# Patient Record
Sex: MALE | Race: WHITE | Employment: UNEMPLOYED | URBAN - METROPOLITAN AREA
[De-identification: names, ages, dates, MRNs, and addresses within clinical notes are randomized per-mention and may not be internally consistent; named-entity substitution may affect disease eponyms.]

---

## 2017-10-30 ENCOUNTER — HOSPITAL ENCOUNTER (OUTPATIENT)
Dept: GENERAL RADIOLOGY | Age: 27
Discharge: HOME | End: 2017-10-30
Attending: ORTHOPAEDIC SURGERY
Payer: COMMERCIAL

## 2017-10-30 ENCOUNTER — HOSPITAL ENCOUNTER (OUTPATIENT)
Dept: CT IMAGING | Age: 27
Discharge: HOME | End: 2017-10-30
Attending: ORTHOPAEDIC SURGERY
Payer: COMMERCIAL

## 2017-10-30 DIAGNOSIS — S43.014A ANTERIOR SHOULDER DISLOCATION, RIGHT, INITIAL ENCOUNTER: ICD-10-CM

## 2017-10-30 PROCEDURE — 74011636320 HC RX REV CODE- 636/320: Performed by: RADIOLOGY

## 2017-10-30 PROCEDURE — 73201 CT UPPER EXTREMITY W/DYE: CPT

## 2017-10-30 PROCEDURE — 23350 INJECTION FOR SHOULDER X-RAY: CPT

## 2017-10-30 PROCEDURE — 74011000250 HC RX REV CODE- 250: Performed by: RADIOLOGY

## 2017-10-30 RX ORDER — LIDOCAINE HYDROCHLORIDE 10 MG/ML
10 INJECTION INFILTRATION; PERINEURAL
Status: COMPLETED | OUTPATIENT
Start: 2017-10-30 | End: 2017-10-30

## 2017-10-30 RX ORDER — SODIUM CHLORIDE 9 MG/ML
3 INJECTION INTRAMUSCULAR; INTRAVENOUS; SUBCUTANEOUS
Status: DISPENSED | OUTPATIENT
Start: 2017-10-30 | End: 2017-10-31

## 2017-10-30 RX ADMIN — LIDOCAINE HYDROCHLORIDE 10 ML: 10 INJECTION, SOLUTION INFILTRATION; PERINEURAL at 16:07

## 2017-10-30 RX ADMIN — IOHEXOL 20 ML: 180 INJECTION INTRAVENOUS at 16:10

## 2018-07-17 ENCOUNTER — OFFICE VISIT (OUTPATIENT)
Dept: INTERNAL MEDICINE CLINIC | Age: 28
End: 2018-07-17

## 2018-07-17 VITALS
BODY MASS INDEX: 25.33 KG/M2 | RESPIRATION RATE: 16 BRPM | OXYGEN SATURATION: 99 % | SYSTOLIC BLOOD PRESSURE: 128 MMHG | WEIGHT: 171 LBS | HEIGHT: 69 IN | DIASTOLIC BLOOD PRESSURE: 96 MMHG | HEART RATE: 92 BPM | TEMPERATURE: 98.1 F

## 2018-07-17 DIAGNOSIS — F41.9 ANXIETY: ICD-10-CM

## 2018-07-17 DIAGNOSIS — K21.9 GASTROESOPHAGEAL REFLUX DISEASE, ESOPHAGITIS PRESENCE NOT SPECIFIED: ICD-10-CM

## 2018-07-17 DIAGNOSIS — I10 HYPERTENSION, ESSENTIAL: Primary | ICD-10-CM

## 2018-07-17 RX ORDER — METOPROLOL SUCCINATE 25 MG/1
25 TABLET, EXTENDED RELEASE ORAL DAILY
COMMUNITY
End: 2022-06-16 | Stop reason: ALTCHOICE

## 2018-07-17 RX ORDER — CHOLECALCIFEROL (VITAMIN D3) 125 MCG
1 CAPSULE ORAL
COMMUNITY

## 2018-07-17 RX ORDER — ALPRAZOLAM 1 MG/1
1 TABLET ORAL
COMMUNITY

## 2018-07-17 RX ORDER — DULOXETIN HYDROCHLORIDE 60 MG/1
60 CAPSULE, DELAYED RELEASE ORAL DAILY
COMMUNITY
End: 2022-07-28 | Stop reason: ALTCHOICE

## 2018-07-17 NOTE — MR AVS SNAPSHOT
[address] 
[phone] Patient: Naa Arboleda MRN: [id number] WUA:8/46/1980 Visit Information Date & Time Provider Department Dept. Phone Encounter #  
 7/17/2018  1:30 PM Arthur Loco, [address] [place] [address] Internal Medicine [phone] [id number] Follow-up Instructions Return if symptoms worsen or fail to improve. Upcoming Health Maintenance Date Due DTaP/Tdap/Td series (1 - Tdap) 3/15/2011 Influenza Age 5 to Adult 8/1/2018 Allergies as of 7/17/2018  Review Complete On: 7/17/2018 By: Arthur Loco MD  
 No Known Allergies Current Immunizations  Reviewed on 7/17/2018 No immunizations on file. Reviewed by Yessica Martinez RN on 7/17/2018 at  1:33 PM  
You Were Diagnosed With   
  
 Codes Comments Hypertension, essential    -  Primary ICD-10-CM: I10 
ICD-9-CM: 401.9 Gastroesophageal reflux disease, esophagitis presence not specified     ICD-10-CM: K21.9 ICD-9-CM: 530.81 Vitals BP Pulse Temp Resp Height(growth percentile) Weight(growth percentile) (!) 128/96 92 98.1 °F (36.7 °C) (Oral) 16 5' 9.1\" (1.755 m) 171 lb (77.6 kg) SpO2 BMI Smoking Status 99% 25.18 kg/m2 Former Smoker BMI and BSA Data Body Mass Index Body Surface Area  
 25.18 kg/m 2 1.95 m 2 Preferred Pharmacy Pharmacy Name Phone [facility] [address] [facility], [address] [facility] [phone] Your Updated Medication List  
  
   
This list is accurate as of 7/17/18  2:05 PM.  Always use your most recent med list.  
  
  
  
  
 ALEVE 220 mg Cap Generic drug:  naproxen sodium Take 1 Cap by mouth daily as needed. CYMBALTA 60 mg capsule Generic drug:  DULoxetine Take 60 mg by mouth daily. metoprolol succinate 25 mg XL tablet Commonly known as:  TOPROL-XL Take 25 mg by mouth daily. XANAX 1 mg tablet Generic drug:  ALPRAZolam  
Take 1 mg by mouth three (3) times daily as needed for Anxiety. Follow-up Instructions Return if symptoms worsen or fail to improve. Patient Instructions Checking your blood pressure at home: Your blood pressure was either borderline or to high. Please check your blood pressure 1 times per day. [place] BP is 120/80. Your BP should be under 140 for the top number and 90 for the bottom number. Please follow up with your PCP in 3-4 weeks. Bring in your cuff to that visit to calibrate it and look for white coat hypertension. Also look into correlation with Cymbalta and when elevated BP started. Minimize the use of Aleve. Learning About High Blood Pressure What is high blood pressure? Blood pressure is a measure of how hard the blood pushes against the walls of your arteries. It's normal for blood pressure to go up and down throughout the day, but if it stays up, you have high blood pressure. Another name for high blood pressure is hypertension. Two numbers tell you your blood pressure. The first number is the systolic pressure. It shows how hard the blood pushes when your heart is pumping. The second number is the diastolic pressure. It shows how hard the blood pushes between heartbeats, when your heart is relaxed and filling with blood. A blood pressure of less than 120/80 (say \"120 over 80\") is ideal for an adult. High blood pressure is 130/80 or higher. You have high blood pressure if your top number is 130 or higher or your bottom number is 80 or higher, or both. What happens when you have high blood pressure? · Blood flows through your arteries with too much force. Over time, this damages the walls of your arteries. But you can't feel it. High blood pressure usually doesn't cause symptoms. · Fat and calcium start to build up in your arteries.  This buildup is called plaque. Plaque makes your arteries narrower and stiffer. Blood can't flow through them as easily. · This lack of good blood flow starts to damage some of the organs in your body. This can lead to problems such as coronary artery disease and heart attack, heart failure, stroke, kidney failure, and eye damage. How can you prevent high blood pressure? · Stay at a healthy weight. · Try to limit how much sodium you eat to less than 2,300 milligrams (mg) a day. If you limit your sodium to 1,500 mg a day, you can lower your blood pressure even more. ¨ Buy foods that are labeled \"unsalted,\" \"sodium-free,\" or \"low-sodium. \" Foods labeled \"reduced-sodium\" and \"light sodium\" may still have too much sodium. ¨ Flavor your food with garlic, lemon juice, onion, vinegar, herbs, and spices instead of salt. Do not use soy sauce, steak sauce, onion salt, garlic salt, mustard, or ketchup on your food. ¨ Use less salt (or none) when recipes call for it. You can often use half the salt a recipe calls for without losing flavor. · Be physically active. Get at least 30 minutes of exercise on most days of the week. Walking is a good choice. You also may want to do other activities, such as running, swimming, cycling, or playing tennis or team sports. · Limit alcohol to 2 drinks a day for men and 1 drink a day for women. · Eat plenty of fruits, vegetables, and low-fat dairy products. Eat less saturated and total fats. How is high blood pressure treated? · Your doctor will suggest making lifestyle changes. For example, your doctor may ask you to eat healthy foods, quit smoking, lose extra weight, and be more active. · If lifestyle changes don't help enough or your blood pressure is very high, you will have to take medicine every day. Follow-up care is a key part of your treatment and safety.  Be sure to make and go to all appointments, and call your doctor if you are having problems. It's also a good idea to know your test results and keep a list of the medicines you take. Where can you learn more? Go to http://luke-yue.info/. Enter P501 in the search box to learn more about \"Learning About High Blood Pressure. \" Current as of: May 10, 2017 Content Version: 11.7 © 2052-6552 [employer]. Care instructions adapted under license by [employer] (which disclaims liability or warranty for this information). If you have questions about a medical condition or this instruction, always ask your healthcare professional. [address] any warranty or liability for your use of this information. Introducing [facility]! Serafin Kitchen introduces Spotie patient portal. Now you can access parts of your medical record, email your doctor's office, and request medication refills online. 1. In your internet browser, go to [email or web address] 2. Click on the First Time User? Click Here link in the Sign In box. You will see the New Member Sign Up page. 3. Enter your Spotie Access Code exactly as it appears below. You will not need to use this code after youve completed the sign-up process. If you do not sign up before the expiration date, you must request a new code. · Spotie Access Code: [email or web address] Expires: 10/15/2018  1:22 PM 
 
4. Enter the last four digits of your Social Security Number (xxxx) and Date of Birth (mm/dd/yyyy) as indicated and click Submit. You will be taken to the next sign-up page. 5. Create a Wantworthyt ID. This will be your Spotie login ID and cannot be changed, so think of one that is secure and easy to remember. 6. Create a Spotie password. You can change your password at any time. 7. Enter your Password Reset Question and Answer. This can be used at a later time if you forget your password. 8. Enter your e-mail address. You will receive e-mail notification when new information is available in [address] 9. Click Sign Up. You can now view and download portions of your medical record. 10. Click the Download Summary menu link to download a portable copy of your medical information. If you have questions, please visit the Frequently Asked Questions section of the [facility] website. Remember, [facility] is NOT to be used for urgent needs. For medical emergencies, dial 911. Now available from your iPhone and Android! Please provide this summary of care documentation to your next provider. Your primary care clinician is listed as Yared Martinez. If you have any questions after today's visit, please call [phone].

## 2018-07-17 NOTE — PROGRESS NOTES
Cory Arboleda is a 29y.o. year old male who is a new patient to me today (07/17/18). He presents to clinic today for a 2nd opinion regarding high BP. His father is a patient of mine and requested my input as whether his son was getting the correct care. Assessment & Plan:   Diagnoses and all orders for this visit:    1. Hypertension, essential- new dx to me, this is a chronic issue, reviewed multiple reason BP can be elevated. Most likely does have elevated BP but did review white coat HTN vs medication induced. He will get amb BP and start checking daily. He will then f/u with his current PCP. Also asked him to talk to PCP about timing of Cymbalta with increase in BP as this is a known side effect. Reviewed classes of medications and alternative options. No changes were made to his regimen. See AVS for instructions. 2. Gastroesophageal reflux disease, esophagitis presence not specified- new dx, poorly controlled, will defer to PCP, reviewed diet triggers. 3. Anxiety- new dx, chronic issue, unclear control, again will defer to current PCP. Reviewed verifying increase in BP does not correlate w/ starting SNRI. Weight is stable for the last few years per him, I have reviewed/discussed the above normal BMI with the patient. I have recommended the following interventions: encourage exercise and lifestyle education regarding diet. Follow-up Disposition:  Return if symptoms worsen or fail to improve. Subjective: Katie Georges was seen today for Establish Care; Hypertension; Depression; and Anxiety    Cardiovascular Review  The patient has hypertension. He reports have been fluctuating ambulatory BP for the last few years. Finally started on medications 9-10 months ago. He does not check ambulatory BP's at home. This is the only medication he has been on. He has been on Xanax for ~14 yrs and Cymbalta for the last 3 yrs.     He reports taking medications as instructed, no medication side effects noted. Diet and Lifestyle: generally follows a low sodium diet, sedentary. Labs: no lab studies available for review at time of visit. He reports weight has been steadily increasing, 5 lbs/yr for the last few years. Lab work from 2010 reviewed and these are the only ones available. VS reviewed, all from ER visits for dislocated shoulder. The following sections were reviewed & updated as appropriate: PMH, PL, PSH, FH, and SH. Patient Active Problem List   Diagnosis Code    Hypertension, essential I10    Anxiety F41.9          Prior to Admission medications    Medication Sig Start Date End Date Taking? Authorizing Provider   ALPRAZolam Yessy Drone) 1 mg tablet Take 1 mg by mouth three (3) times daily as needed for Anxiety. Yes Historical Provider   DULoxetine (CYMBALTA) 60 mg capsule Take 60 mg by mouth daily. Yes Historical Provider   metoprolol succinate (TOPROL-XL) 25 mg XL tablet Take 25 mg by mouth daily. Yes Historical Provider   naproxen sodium (ALEVE) 220 mg cap Take 1 Cap by mouth daily as needed. Yes Historical Provider          No Known Allergies         Review of Systems   Constitutional: Negative for malaise/fatigue and weight loss. Respiratory: Negative for cough and shortness of breath. Cardiovascular: Negative for chest pain, palpitations and leg swelling. Gastrointestinal: Positive for heartburn (intermittent). Negative for abdominal pain, constipation, diarrhea, nausea and vomiting. Genitourinary: Negative for frequency. Musculoskeletal: Negative for joint pain and myalgias. Skin: Negative for rash. Neurological: Negative for tingling, sensory change, focal weakness and headaches. Psychiatric/Behavioral: Negative for depression. The patient is not nervous/anxious and does not have insomnia. Objective:   Physical Exam   Constitutional: No distress.    HENT:   Mouth/Throat: Mucous membranes are normal.   Eyes: Conjunctivae are normal. No scleral icterus. Neck: Neck supple. Cardiovascular: Regular rhythm and normal heart sounds. No murmur heard. Pulmonary/Chest: Effort normal and breath sounds normal. He has no wheezes. He has no rales. Abdominal: Soft. Bowel sounds are normal. He exhibits no mass. There is no hepatosplenomegaly. There is no tenderness. Musculoskeletal: He exhibits no edema. Lymphadenopathy:     He has no cervical adenopathy. Skin: No rash noted. Psychiatric: He has a normal mood and affect. His behavior is normal.          Visit Vitals    BP (!) 128/96    Pulse 92    Temp 98.1 °F (36.7 °C) (Oral)    Resp 16    Ht 5' 9.1\" (1.755 m)    Wt 171 lb (77.6 kg)    SpO2 99%    BMI 25.18 kg/m2         Disclaimer:  Advised him to call back or return to office if symptoms worsen/change/persist.  Discussed expected course/resolution/complications of diagnosis in detail with patient. Medication risks/benefits/costs/interactions/alternatives discussed with patient. He was given an after visit summary which includes diagnoses, current medications, & vitals. He expressed understanding with the diagnosis and plan. Aspects of this note may have been generated using voice recognition software. Despite editing, there may be some syntax errors.        Nancy Mccall MD

## 2018-07-17 NOTE — PATIENT INSTRUCTIONS
Checking your blood pressure at home: Your blood pressure was either borderline or to high. Please check your blood pressure 1 times per day. [place] BP is 120/80. Your BP should be under 140 for the top number and 90 for the bottom number. Please follow up with your PCP in 3-4 weeks. Bring in your cuff to that visit to calibrate it and look for white coat hypertension. Also look into correlation with Cymbalta and when elevated BP started. Minimize the use of Aleve. Learning About High Blood Pressure  What is high blood pressure? Blood pressure is a measure of how hard the blood pushes against the walls of your arteries. It's normal for blood pressure to go up and down throughout the day, but if it stays up, you have high blood pressure. Another name for high blood pressure is hypertension. Two numbers tell you your blood pressure. The first number is the systolic pressure. It shows how hard the blood pushes when your heart is pumping. The second number is the diastolic pressure. It shows how hard the blood pushes between heartbeats, when your heart is relaxed and filling with blood. A blood pressure of less than 120/80 (say \"120 over 80\") is ideal for an adult. High blood pressure is 130/80 or higher. You have high blood pressure if your top number is 130 or higher or your bottom number is 80 or higher, or both. What happens when you have high blood pressure? · Blood flows through your arteries with too much force. Over time, this damages the walls of your arteries. But you can't feel it. High blood pressure usually doesn't cause symptoms. · Fat and calcium start to build up in your arteries. This buildup is called plaque. Plaque makes your arteries narrower and stiffer. Blood can't flow through them as easily. · This lack of good blood flow starts to damage some of the organs in your body.  This can lead to problems such as coronary artery disease and heart attack, heart failure, stroke, kidney failure, and eye damage. How can you prevent high blood pressure? · Stay at a healthy weight. · Try to limit how much sodium you eat to less than 2,300 milligrams (mg) a day. If you limit your sodium to 1,500 mg a day, you can lower your blood pressure even more. ¨ Buy foods that are labeled \"unsalted,\" \"sodium-free,\" or \"low-sodium. \" Foods labeled \"reduced-sodium\" and \"light sodium\" may still have too much sodium. ¨ Flavor your food with garlic, lemon juice, onion, vinegar, herbs, and spices instead of salt. Do not use soy sauce, steak sauce, onion salt, garlic salt, mustard, or ketchup on your food. ¨ Use less salt (or none) when recipes call for it. You can often use half the salt a recipe calls for without losing flavor. · Be physically active. Get at least 30 minutes of exercise on most days of the week. Walking is a good choice. You also may want to do other activities, such as running, swimming, cycling, or playing tennis or team sports. · Limit alcohol to 2 drinks a day for men and 1 drink a day for women. · Eat plenty of fruits, vegetables, and low-fat dairy products. Eat less saturated and total fats. How is high blood pressure treated? · Your doctor will suggest making lifestyle changes. For example, your doctor may ask you to eat healthy foods, quit smoking, lose extra weight, and be more active. · If lifestyle changes don't help enough or your blood pressure is very high, you will have to take medicine every day. Follow-up care is a key part of your treatment and safety. Be sure to make and go to all appointments, and call your doctor if you are having problems. It's also a good idea to know your test results and keep a list of the medicines you take. Where can you learn more? Go to http://luke-yue.info/. Enter P501 in the search box to learn more about \"Learning About High Blood Pressure. \"  Current as of:  May 10, 2017  Content Version: 11.7  © 0996-5317 [employer]. Care instructions adapted under license by [employer] (which disclaims liability or warranty for this information). If you have questions about a medical condition or this instruction, always ask your healthcare professional. [place] [address] any warranty or liability for your use of this information.

## 2022-03-19 PROBLEM — I10 HYPERTENSION, ESSENTIAL: Status: ACTIVE | Noted: 2018-07-17

## 2022-03-19 PROBLEM — F41.9 ANXIETY: Status: ACTIVE | Noted: 2018-07-17

## 2022-06-16 ENCOUNTER — OFFICE VISIT (OUTPATIENT)
Dept: CARDIOLOGY CLINIC | Age: 32
End: 2022-06-16
Payer: COMMERCIAL

## 2022-06-16 VITALS
SYSTOLIC BLOOD PRESSURE: 112 MMHG | OXYGEN SATURATION: 97 % | HEIGHT: 70 IN | WEIGHT: 162.6 LBS | HEART RATE: 79 BPM | DIASTOLIC BLOOD PRESSURE: 62 MMHG | RESPIRATION RATE: 16 BRPM | BODY MASS INDEX: 23.28 KG/M2

## 2022-06-16 DIAGNOSIS — I10 HYPERTENSION, ESSENTIAL: Primary | ICD-10-CM

## 2022-06-16 DIAGNOSIS — F41.9 ANXIETY: ICD-10-CM

## 2022-06-16 PROCEDURE — 99204 OFFICE O/P NEW MOD 45 MIN: CPT | Performed by: SPECIALIST

## 2022-06-16 RX ORDER — FLUOXETINE HYDROCHLORIDE 40 MG/1
40 CAPSULE ORAL DAILY
COMMUNITY
Start: 2022-04-27

## 2022-06-16 RX ORDER — FLUOXETINE HYDROCHLORIDE 40 MG/1
CAPSULE ORAL
COMMUNITY
Start: 2022-05-28

## 2022-06-16 NOTE — PROGRESS NOTES
HISTORY OF PRESENT ILLNESS  Junior Arboleda is a 28 y.o. male. He is seen for a second opinion regarding hypertension. He has been on Toprol-XL 25 mg a day for the past 4 years and it was started with a blood pressure of 160/110 at that time. He had extreme anxiety as he was going through a divorce and living with father people who were doing drugs. He was doing 6 beers a week but stopped drinking several months ago. He claims to be an alcoholic. His weight is down 24 pounds over the past 2 years. He does not smoke cigarettes. He exercises 4 times a week and can run a mile and do sit ups and yoga. HPI  Patient Active Problem List   Diagnosis Code    Hypertension, essential I10    Anxiety F41.9     Current Outpatient Medications   Medication Sig Dispense Refill    FLUoxetine (PROzac) 40 mg capsule Take 40 mg by mouth daily.  ALPRAZolam (XANAX) 1 mg tablet Take 1 mg by mouth three (3) times daily as needed for Anxiety.  naproxen sodium (ALEVE) 220 mg cap Take 1 Cap by mouth daily as needed.  FLUoxetine (PROzac) 40 mg capsule       DULoxetine (CYMBALTA) 60 mg capsule Take 60 mg by mouth daily. Past Medical History:   Diagnosis Date    Anxiety 7/17/2018    Asthma     Hypertension, essential 7/17/2018     Past Surgical History:   Procedure Laterality Date    HX SHOULDER ARTHROSCOPY Right     due to frequent disloations       Review of Systems   Constitutional: Positive for weight loss. All other systems reviewed and are negative. Visit Vitals  /62 (BP 1 Location: Left upper arm, BP Patient Position: Sitting, BP Cuff Size: Adult)   Pulse 79   Resp 16   Ht 5' 9.5\" (1.765 m)   Wt 162 lb 9.6 oz (73.8 kg)   SpO2 97%   BMI 23.67 kg/m²       Physical Exam  Vitals and nursing note reviewed. Constitutional:       Appearance: Normal appearance. HENT:      Head: Normocephalic.       Right Ear: External ear normal.      Left Ear: External ear normal.      Nose: Nose normal. Mouth/Throat:      Mouth: Mucous membranes are moist.   Eyes:      Extraocular Movements: Extraocular movements intact. Cardiovascular:      Rate and Rhythm: Normal rate and regular rhythm. Heart sounds: Normal heart sounds. Pulmonary:      Breath sounds: Normal breath sounds. Abdominal:      Palpations: Abdomen is soft. Musculoskeletal:         General: Normal range of motion. Cervical back: Normal range of motion. Skin:     General: Skin is warm. Neurological:      Mental Status: He is alert and oriented to person, place, and time. Psychiatric:         Behavior: Behavior normal.         ASSESSMENT and PLAN  He is on a very low-dose of metoprolol and his overall situation is much different than it was 4 years ago. I will stop the Toprol-XL 25 mg and see him back in 6 weeks.

## 2022-07-28 ENCOUNTER — OFFICE VISIT (OUTPATIENT)
Dept: CARDIOLOGY CLINIC | Age: 32
End: 2022-07-28
Payer: COMMERCIAL

## 2022-07-28 VITALS
WEIGHT: 175 LBS | OXYGEN SATURATION: 97 % | DIASTOLIC BLOOD PRESSURE: 60 MMHG | SYSTOLIC BLOOD PRESSURE: 130 MMHG | HEIGHT: 69 IN | BODY MASS INDEX: 25.92 KG/M2 | HEART RATE: 79 BPM | RESPIRATION RATE: 16 BRPM

## 2022-07-28 DIAGNOSIS — I10 HYPERTENSION, ESSENTIAL: Primary | ICD-10-CM

## 2022-07-28 DIAGNOSIS — F41.9 ANXIETY: ICD-10-CM

## 2022-07-28 PROCEDURE — 99214 OFFICE O/P EST MOD 30 MIN: CPT | Performed by: SPECIALIST

## 2022-07-28 RX ORDER — MELOXICAM 15 MG/1
15 TABLET ORAL DAILY
COMMUNITY
Start: 2022-07-15 | End: 2022-08-14

## 2022-07-28 NOTE — PROGRESS NOTES
HISTORY OF PRESENT ILLNESS  Nandini Arboleda is a 28 y.o. male. He is seen in follow-up for hypertension. He had been on metoprolol long-acting 25 mg and his blood pressures were quite low and I stopped the medication. His blood pressure is still normal but up somewhat but he is also gained 13 pounds. He tells me that his weight can fluctuate a great deal and he is hungry all the time. He does exercise occasionally and can run a mile. He takes several medications for anxiety and depression. HPI  Patient Active Problem List   Diagnosis Code    Hypertension, essential I10    Anxiety F41.9     Current Outpatient Medications   Medication Sig Dispense Refill    meloxicam (MOBIC) 15 mg tablet Take 15 mg by mouth in the morning. FLUoxetine (PROzac) 40 mg capsule Take 40 mg by mouth daily. ALPRAZolam (XANAX) 1 mg tablet Take 1 mg by mouth three (3) times daily as needed for Anxiety. naproxen sodium 220 mg cap Take 1 Cap by mouth daily as needed. FLUoxetine (PROzac) 40 mg capsule        Past Medical History:   Diagnosis Date    Anxiety 7/17/2018    Asthma     Hypertension, essential 7/17/2018     Past Surgical History:   Procedure Laterality Date    HX SHOULDER ARTHROSCOPY Right     due to frequent disloations       Review of Systems   Psychiatric/Behavioral:  The patient is nervous/anxious. All other systems reviewed and are negative. Visit Vitals  /60   Pulse 79   Resp 16   Ht 5' 9\" (1.753 m)   Wt 175 lb (79.4 kg)   SpO2 97%   BMI 25.84 kg/m²       Physical Exam  Vitals and nursing note reviewed. Constitutional:       Appearance: Normal appearance. HENT:      Head: Normocephalic. Right Ear: External ear normal.      Left Ear: External ear normal.      Nose: Nose normal.      Mouth/Throat:      Mouth: Mucous membranes are moist.   Eyes:      Extraocular Movements: Extraocular movements intact. Cardiovascular:      Rate and Rhythm: Normal rate and regular rhythm.       Heart sounds: Normal heart sounds. Pulmonary:      Breath sounds: Normal breath sounds. Abdominal:      Palpations: Abdomen is soft. Musculoskeletal:         General: Normal range of motion. Cervical back: Normal range of motion. Skin:     General: Skin is warm. Neurological:      Mental Status: He is alert and oriented to person, place, and time. Psychiatric:         Behavior: Behavior normal.       ASSESSMENT and PLAN  Seems to be doing her and he may stay at blood pressure medications. I talked him about strategies to not eat late at night and to increase his satiety. I will see him back as needed.

## 2022-09-22 ENCOUNTER — OFFICE VISIT (OUTPATIENT)
Dept: CARDIOLOGY CLINIC | Age: 32
End: 2022-09-22
Payer: COMMERCIAL

## 2022-09-22 VITALS
RESPIRATION RATE: 16 BRPM | WEIGHT: 177 LBS | HEART RATE: 120 BPM | SYSTOLIC BLOOD PRESSURE: 138 MMHG | DIASTOLIC BLOOD PRESSURE: 104 MMHG | BODY MASS INDEX: 25.34 KG/M2 | OXYGEN SATURATION: 98 % | HEIGHT: 70 IN

## 2022-09-22 DIAGNOSIS — M54.50 ACUTE MIDLINE LOW BACK PAIN WITHOUT SCIATICA: ICD-10-CM

## 2022-09-22 DIAGNOSIS — I10 HYPERTENSION, ESSENTIAL: Primary | ICD-10-CM

## 2022-09-22 DIAGNOSIS — F41.9 ANXIETY: ICD-10-CM

## 2022-09-22 PROCEDURE — 99214 OFFICE O/P EST MOD 30 MIN: CPT | Performed by: SPECIALIST

## 2022-09-22 RX ORDER — CYCLOBENZAPRINE HCL 10 MG
10 TABLET ORAL
COMMUNITY
Start: 2022-08-26

## 2022-09-22 RX ORDER — ACETAMINOPHEN 500 MG
500 TABLET ORAL
COMMUNITY

## 2022-09-22 RX ORDER — NEBIVOLOL 5 MG/1
5 TABLET ORAL DAILY
Qty: 30 TABLET | Refills: 11 | Status: SHIPPED | OUTPATIENT
Start: 2022-09-22

## 2022-09-22 NOTE — PROGRESS NOTES
HISTORY OF PRESENT ILLNESS  Ulysses Arboleda is a 28 y.o. male. He has hypertension and anxiety. When I initially saw him he was on low-dose of metoprolol which I stopped due to a low blood pressure. He now is having back pain and more anxiety and is taking Xanax which she did not take this morning. He takes nothing for blood pressure at present. He has gained 15 pounds since I started seeing him several months ago. HPI  Patient Active Problem List   Diagnosis Code    Hypertension, essential I10    Anxiety F41.9     Current Outpatient Medications   Medication Sig Dispense Refill    cyclobenzaprine (FLEXERIL) 10 mg tablet Take 10 mg by mouth. acetaminophen (TYLENOL) 500 mg tablet Take 500 mg by mouth every six (6) hours as needed. nebivoloL (BYSTOLIC) 5 mg tablet Take 1 Tablet by mouth daily. 30 Tablet 11    FLUoxetine (PROzac) 40 mg capsule Take 40 mg by mouth daily. ALPRAZolam (XANAX) 1 mg tablet Take 1 mg by mouth three (3) times daily as needed for Anxiety. naproxen sodium 220 mg cap Take 1 Cap by mouth daily as needed. FLUoxetine (PROzac) 40 mg capsule        Past Medical History:   Diagnosis Date    Anxiety 7/17/2018    Asthma     Hypertension, essential 7/17/2018     Past Surgical History:   Procedure Laterality Date    HX SHOULDER ARTHROSCOPY Right     due to frequent disloations       Review of Systems   Musculoskeletal:  Positive for back pain. Psychiatric/Behavioral:  The patient is nervous/anxious. All other systems reviewed and are negative. Visit Vitals  BP (!) 138/104   Pulse (!) 120   Resp 16   Ht 5' 9.5\" (1.765 m)   Wt 177 lb (80.3 kg)   SpO2 98%   BMI 25.76 kg/m²       Physical Exam  Vitals and nursing note reviewed. Constitutional:       Appearance: Normal appearance. HENT:      Head: Normocephalic.       Right Ear: External ear normal.      Left Ear: External ear normal.      Nose: Nose normal.      Mouth/Throat:      Mouth: Mucous membranes are moist. Eyes:      Extraocular Movements: Extraocular movements intact. Cardiovascular:      Rate and Rhythm: Normal rate and regular rhythm. Heart sounds: Normal heart sounds. Pulmonary:      Effort: Pulmonary effort is normal.   Abdominal:      Palpations: Abdomen is soft. Musculoskeletal:         General: Normal range of motion. Cervical back: Normal range of motion. Skin:     General: Skin is warm. Neurological:      Mental Status: He is alert and oriented to person, place, and time. Psychiatric:         Behavior: Behavior normal.       ASSESSMENT and PLAN  He has a great deal of anxiety and pain and this is making his blood pressure higher. I do think a beta-blocker is the best class for him but I will switch him to Bystolic 5 mg rather than the metoprolol since his weight is now higher and he has the back pain. He will call us if he is having any problems with the medication but otherwise I will see him back in 1 month.

## 2022-09-22 NOTE — LETTER
9/22/2022     Mr. Janay [place] [id number]      To Whom It May Concern: Levi Escobar Arboleda is currently under the care of [facility]. He was seen by me in the office today. Due to medical reasons, he should not return to work until Monday 9/26. If there are questions or concerns please have the patient contact our office.         Sincerely,      Bettye Weiss MD

## 2022-11-28 DIAGNOSIS — I10 HYPERTENSION, ESSENTIAL: Primary | ICD-10-CM

## 2022-11-28 RX ORDER — NEBIVOLOL 5 MG/1
5 TABLET ORAL DAILY
Qty: 30 TABLET | Refills: 0 | Status: SHIPPED | OUTPATIENT
Start: 2022-11-28

## 2022-11-28 NOTE — TELEPHONE ENCOUNTER
Patient is calling to request a refill for Nebivolol 5mg. Please Advise.     Pharmacy Verified     [phone]

## 2022-11-28 NOTE — TELEPHONE ENCOUNTER
Made note on rx for patient to reschedule missed appointment (no showed w wk fu appt in October 2022). Requested Prescriptions     Signed Prescriptions Disp Refills    nebivoloL (BYSTOLIC) 5 mg tablet 30 Tablet 0     Sig: Take 1 Tablet by mouth daily.  PLEASE RESCHEDULE MISSED APPOINTMENT     Authorizing Provider: Austin Heredia     Ordering User: Minnie Farris    Per Dr. Esparza School verbal order

## 2022-12-09 DIAGNOSIS — I10 HYPERTENSION, ESSENTIAL: ICD-10-CM

## 2022-12-09 RX ORDER — NEBIVOLOL 5 MG/1
5 TABLET ORAL DAILY
Qty: 30 TABLET | Refills: 0 | Status: SHIPPED | OUTPATIENT
Start: 2022-12-09

## 2022-12-09 NOTE — TELEPHONE ENCOUNTER
Requested Prescriptions     Signed Prescriptions Disp Refills    nebivoloL (BYSTOLIC) 5 mg tablet 30 Tablet 0     Sig: Take 1 Tablet by mouth daily.  *NEED TO SCHEDULE FOLLOW UP APPOINTMENT FOR FURTHER REFILLS*     Authorizing Provider: Arcadio Chavez     Ordering User: Nasir Mccain    Per Dr. Kale Lopez verbal order

## 2023-01-19 DIAGNOSIS — I10 HYPERTENSION, ESSENTIAL: ICD-10-CM

## 2023-01-19 RX ORDER — NEBIVOLOL 5 MG/1
5 TABLET ORAL DAILY
Qty: 30 TABLET | Refills: 0 | Status: SHIPPED | OUTPATIENT
Start: 2023-01-19

## 2023-01-19 NOTE — TELEPHONE ENCOUNTER
Requested Prescriptions     Signed Prescriptions Disp Refills    nebivoloL (BYSTOLIC) 5 mg tablet 30 Tablet 0     Sig: Take 1 Tablet by mouth daily.  *NEED TO SCHEDULE FOLLOW UP APPOINTMENT FOR FURTHER REFILLS*     Authorizing Provider: Paul Kaye     Ordering User: Kvng Hernandez    Per Dr. Keaton Latham verbal order     *NEED TO SCHEDULE FOLLOW UP APPOINTMENT FOR FURTHER REFILLS*

## 2023-02-22 DIAGNOSIS — I10 HYPERTENSION, ESSENTIAL: ICD-10-CM

## 2023-02-22 RX ORDER — NEBIVOLOL 5 MG/1
5 TABLET ORAL DAILY
Qty: 30 TABLET | Refills: 0 | Status: SHIPPED | OUTPATIENT
Start: 2023-02-22

## 2023-02-22 NOTE — TELEPHONE ENCOUNTER
Requested Prescriptions     Signed Prescriptions Disp Refills    nebivoloL (BYSTOLIC) 5 mg tablet 30 Tablet 0     Sig: Take 1 Tablet by mouth daily.  *NEED TO SCHEDULE FOLLOW UP APPOINTMENT FOR FURTHER REFILLS*     Authorizing Provider: Tracie David     Ordering User: Cecilio Nevarez   Per Dr. Jl Felxi verbal order

## 2023-08-09 DIAGNOSIS — I10 ESSENTIAL (PRIMARY) HYPERTENSION: Primary | ICD-10-CM

## 2023-08-09 RX ORDER — NEBIVOLOL 5 MG/1
5 TABLET ORAL DAILY
Qty: 15 TABLET | Refills: 0 | Status: SHIPPED | OUTPATIENT
Start: 2023-08-09

## 2023-08-09 RX ORDER — NEBIVOLOL 5 MG/1
TABLET ORAL
Qty: 15 TABLET | Refills: 0 | Status: SHIPPED | OUTPATIENT
Start: 2023-08-09 | End: 2023-08-09 | Stop reason: SDUPTHER

## 2023-08-09 NOTE — TELEPHONE ENCOUNTER
Requested Prescriptions     Signed Prescriptions Disp Refills    nebivolol (BYSTOLIC) 5 MG tablet 15 tablet 0     Sig: Take 1 tablet by mouth daily *NEED TO SCHEDULE FOLLOW UP APPOINTMENT FOR FURTHER REFILLS*     Authorizing Provider: Kiah James     Ordering User: Milla Costa    Per Dr. Gabriel Tracey verbal order.       *NEED TO SCHEDULE FOLLOW UP APPOINTMENT FOR FURTHER REFILLS*

## 2023-10-03 DIAGNOSIS — I10 ESSENTIAL (PRIMARY) HYPERTENSION: ICD-10-CM

## 2023-10-04 RX ORDER — NEBIVOLOL 5 MG/1
5 TABLET ORAL DAILY
Qty: 30 TABLET | Refills: 0 | OUTPATIENT
Start: 2023-10-04